# Patient Record
Sex: MALE | Race: BLACK OR AFRICAN AMERICAN | NOT HISPANIC OR LATINO | URBAN - METROPOLITAN AREA
[De-identification: names, ages, dates, MRNs, and addresses within clinical notes are randomized per-mention and may not be internally consistent; named-entity substitution may affect disease eponyms.]

---

## 2020-10-23 ENCOUNTER — LAB OUTSIDE AN ENCOUNTER (OUTPATIENT)
Dept: URBAN - METROPOLITAN AREA CLINIC 118 | Facility: CLINIC | Age: 50
End: 2020-10-23

## 2020-10-23 ENCOUNTER — OFFICE VISIT (OUTPATIENT)
Dept: URBAN - METROPOLITAN AREA CLINIC 118 | Facility: CLINIC | Age: 50
End: 2020-10-23
Payer: COMMERCIAL

## 2020-10-23 ENCOUNTER — TELEPHONE ENCOUNTER (OUTPATIENT)
Dept: URBAN - METROPOLITAN AREA CLINIC 92 | Facility: CLINIC | Age: 50
End: 2020-10-23

## 2020-10-23 DIAGNOSIS — R10.9 ABDOMINAL PAIN: ICD-10-CM

## 2020-10-23 DIAGNOSIS — K51.90 ULCERATIVE COLITIS: ICD-10-CM

## 2020-10-23 PROCEDURE — 99214 OFFICE O/P EST MOD 30 MIN: CPT | Performed by: INTERNAL MEDICINE

## 2020-10-23 RX ORDER — AMLODIPINE AND VALSARTAN 5; 160 MG/1; MG/1
TABLET, FILM COATED ORAL
Qty: 0 | Refills: 0 | Status: ACTIVE | COMMUNITY
Start: 1900-01-01

## 2020-10-23 NOTE — HPI-TODAY'S VISIT:
pt h/o UC and maintained on Sulfasaline reprots recent flair and was admitted outside hospital for 3 days and told flair. Reports symptoms improving with reforming bm's without blood. Denies weight loss or anemia. Denies UGI symptoms. Pt reports was also told may have 'adrenal problem'

## 2020-12-04 ENCOUNTER — OFFICE VISIT (OUTPATIENT)
Dept: URBAN - METROPOLITAN AREA SURGERY CENTER 23 | Facility: SURGERY CENTER | Age: 50
End: 2020-12-04
Payer: COMMERCIAL

## 2020-12-04 DIAGNOSIS — K62.1 DYSPLASTIC POLYP OF RECTUM: ICD-10-CM

## 2020-12-04 DIAGNOSIS — K51.011 CHRONIC ULCERATIVE ENTEROCOLITIS WITH RECTAL BLEEDING: ICD-10-CM

## 2020-12-04 PROCEDURE — G8907 PT DOC NO EVENTS ON DISCHARG: HCPCS | Performed by: INTERNAL MEDICINE

## 2020-12-04 PROCEDURE — 45380 COLONOSCOPY AND BIOPSY: CPT | Performed by: INTERNAL MEDICINE

## 2020-12-04 PROCEDURE — G9937 DIG OR SURV COLSCO: HCPCS | Performed by: INTERNAL MEDICINE

## 2020-12-04 PROCEDURE — 45385 COLONOSCOPY W/LESION REMOVAL: CPT | Performed by: INTERNAL MEDICINE

## 2020-12-09 ENCOUNTER — TELEPHONE ENCOUNTER (OUTPATIENT)
Dept: URBAN - METROPOLITAN AREA CLINIC 118 | Facility: CLINIC | Age: 50
End: 2020-12-09

## 2020-12-09 RX ORDER — SULFASALAZINE 500 MG/1
2 TABLET TABLET ORAL TWICE A DAY
Qty: 120 TABLET | Refills: 5 | OUTPATIENT
Start: 2020-12-10 | End: 2021-06-08

## 2021-12-28 ENCOUNTER — TELEPHONE ENCOUNTER (OUTPATIENT)
Dept: URBAN - METROPOLITAN AREA CLINIC 118 | Facility: CLINIC | Age: 51
End: 2021-12-28

## 2021-12-28 RX ORDER — SULFASALAZINE 500 MG/1
4 TABLET TABLET, DELAYED RELEASE ORAL ONCE A DAY
Qty: 120 TABLET | Refills: 2 | OUTPATIENT
Start: 2021-12-29 | End: 2022-03-29

## 2021-12-29 ENCOUNTER — ERX REFILL RESPONSE (OUTPATIENT)
Dept: URBAN - METROPOLITAN AREA CLINIC 118 | Facility: CLINIC | Age: 51
End: 2021-12-29

## 2021-12-29 RX ORDER — SULFASALAZINE 500 MG/1
4 TABLET TABLET, DELAYED RELEASE ORAL ONCE A DAY
Qty: 120 TABLET | Refills: 2 | OUTPATIENT

## 2021-12-29 RX ORDER — SULFASALAZINE 500 MG/1
TAKE 2 TABLETS BY MOUTH TWICE DAILY TABLET ORAL
Qty: 120 TABLET | Refills: 1 | OUTPATIENT

## 2022-03-01 ENCOUNTER — TELEPHONE ENCOUNTER (OUTPATIENT)
Dept: URBAN - METROPOLITAN AREA CLINIC 92 | Facility: CLINIC | Age: 52
End: 2022-03-01

## 2022-03-01 ENCOUNTER — OFFICE VISIT (OUTPATIENT)
Dept: URBAN - METROPOLITAN AREA CLINIC 118 | Facility: CLINIC | Age: 52
End: 2022-03-01
Payer: COMMERCIAL

## 2022-03-01 DIAGNOSIS — K51.90 ULCERATIVE COLITIS: ICD-10-CM

## 2022-03-01 DIAGNOSIS — R10.9 ABDOMINAL PAIN: ICD-10-CM

## 2022-03-01 PROCEDURE — 99213 OFFICE O/P EST LOW 20 MIN: CPT | Performed by: INTERNAL MEDICINE

## 2022-03-01 RX ORDER — AMLODIPINE AND VALSARTAN 5; 160 MG/1; MG/1
TABLET, FILM COATED ORAL
Qty: 0 | Refills: 0 | Status: ACTIVE | COMMUNITY
Start: 1900-01-01

## 2022-03-01 RX ORDER — SULFASALAZINE 500 MG/1
3 TABLET TABLET ORAL TWICE A DAY
Qty: 540 | Refills: 3 | OUTPATIENT
Start: 2022-03-01 | End: 2022-06-29

## 2022-03-01 RX ORDER — SULFASALAZINE 500 MG/1
TAKE 2 TABLETS BY MOUTH TWICE DAILY TABLET ORAL
Qty: 120 TABLET | Refills: 1 | Status: DISCONTINUED | COMMUNITY

## 2022-03-01 NOTE — HPI-TODAY'S VISIT:
pt presents for follow up UC. pt reports doing well. Pt reports stable GI symptoms with noted no significant diarrhea, constipation or rectal bleeding. Pt notes currently taking Sulfasaline 2 tab po qd. No nausea, vomiting, gerd or other UGI symptoms. No recent weight loss or anemia. pt would like to continue current meds.

## 2023-03-09 ENCOUNTER — LAB OUTSIDE AN ENCOUNTER (OUTPATIENT)
Dept: URBAN - METROPOLITAN AREA CLINIC 118 | Facility: CLINIC | Age: 53
End: 2023-03-09

## 2023-03-09 ENCOUNTER — DASHBOARD ENCOUNTERS (OUTPATIENT)
Age: 53
End: 2023-03-09

## 2023-03-09 ENCOUNTER — OFFICE VISIT (OUTPATIENT)
Dept: URBAN - METROPOLITAN AREA CLINIC 118 | Facility: CLINIC | Age: 53
End: 2023-03-09
Payer: COMMERCIAL

## 2023-03-09 VITALS
SYSTOLIC BLOOD PRESSURE: 147 MMHG | HEIGHT: 71 IN | WEIGHT: 211.8 LBS | HEART RATE: 60 BPM | DIASTOLIC BLOOD PRESSURE: 91 MMHG | TEMPERATURE: 97.7 F | BODY MASS INDEX: 29.65 KG/M2

## 2023-03-09 DIAGNOSIS — K51.80 OTHER ULCERATIVE COLITIS: ICD-10-CM

## 2023-03-09 PROBLEM — 64766004 ULCERATIVE COLITIS: Status: ACTIVE | Noted: 2020-10-23

## 2023-03-09 PROCEDURE — 99213 OFFICE O/P EST LOW 20 MIN: CPT | Performed by: INTERNAL MEDICINE

## 2023-03-09 RX ORDER — AMLODIPINE AND VALSARTAN 5; 160 MG/1; MG/1
TABLET, FILM COATED ORAL
Qty: 0 | Refills: 0 | Status: ACTIVE | COMMUNITY
Start: 1900-01-01

## 2023-03-09 RX ORDER — SULFASALAZINE 500 MG/1
3 TABLET TABLET ORAL TWICE A DAY
Qty: 540 TABLET | Refills: 3 | OUTPATIENT
Start: 2023-03-09 | End: 2024-03-03

## 2023-03-09 NOTE — HPI-TODAY'S VISIT:
Mr. Bansal is a 52 y/o AAM who presents today for f/u on UC.  Was last seen a year ago. Has been doing well on Sulfasalazine. Denies GI bleeding, changes in bowel habits, NV, unintentional weight loss or abdominal pain. No upper GI symptoms. Currently having 3 BM/day, mostly always formed.  Last colon was 12/2020 with Dr. Leigh which showed an 8-9mm inflammatory polyp in the colon. Also showed areas of erythematous, inflammed and vascular pattern decreased mucosa, bx indicating mild to moderate active colitis from the rectum to the right colon, TI was normal. Recommended to repeat in 2 years for surveillance.

## 2023-03-27 ENCOUNTER — OFFICE VISIT (OUTPATIENT)
Dept: URBAN - METROPOLITAN AREA SURGERY CENTER 23 | Facility: SURGERY CENTER | Age: 53
End: 2023-03-27

## 2023-03-27 ENCOUNTER — CLAIMS CREATED FROM THE CLAIM WINDOW (OUTPATIENT)
Dept: URBAN - METROPOLITAN AREA CLINIC 4 | Facility: CLINIC | Age: 53
End: 2023-03-27
Payer: COMMERCIAL

## 2023-03-27 ENCOUNTER — CLAIMS CREATED FROM THE CLAIM WINDOW (OUTPATIENT)
Dept: URBAN - METROPOLITAN AREA SURGERY CENTER 23 | Facility: SURGERY CENTER | Age: 53
End: 2023-03-27
Payer: COMMERCIAL

## 2023-03-27 ENCOUNTER — TELEPHONE ENCOUNTER (OUTPATIENT)
Dept: URBAN - METROPOLITAN AREA CLINIC 35 | Facility: CLINIC | Age: 53
End: 2023-03-27

## 2023-03-27 DIAGNOSIS — K63.89 OTHER SPECIFIED DISEASES OF INTESTINE: ICD-10-CM

## 2023-03-27 DIAGNOSIS — K51.811 OTHER ULCERATIVE COLITIS WITH RECTAL BLEEDING: ICD-10-CM

## 2023-03-27 PROCEDURE — 45380 COLONOSCOPY AND BIOPSY: CPT | Performed by: INTERNAL MEDICINE

## 2023-03-27 PROCEDURE — G8907 PT DOC NO EVENTS ON DISCHARG: HCPCS | Performed by: INTERNAL MEDICINE

## 2023-03-27 PROCEDURE — 88305 TISSUE EXAM BY PATHOLOGIST: CPT | Performed by: PATHOLOGY

## 2023-03-27 RX ORDER — SULFASALAZINE 500 MG/1
3 TABLET TABLET ORAL TWICE A DAY
Qty: 540 TABLET | Refills: 3
Start: 2023-03-09 | End: 2024-03-21

## 2023-03-27 RX ORDER — AMLODIPINE AND VALSARTAN 5; 160 MG/1; MG/1
TABLET, FILM COATED ORAL
Qty: 0 | Refills: 0 | Status: ACTIVE | COMMUNITY
Start: 1900-01-01

## 2023-03-27 RX ORDER — SULFASALAZINE 500 MG/1
3 TABLET TABLET ORAL TWICE A DAY
Qty: 540 TABLET | Refills: 3 | Status: ACTIVE | COMMUNITY
Start: 2023-03-09 | End: 2024-03-03

## 2024-05-29 ENCOUNTER — ERX REFILL RESPONSE (OUTPATIENT)
Dept: URBAN - METROPOLITAN AREA CLINIC 118 | Facility: CLINIC | Age: 54
End: 2024-05-29

## 2024-05-29 RX ORDER — SULFASALAZINE 500 MG/1
TAKE 3 TABLETS BY MOUTH TWICE DAILY TABLET ORAL
Qty: 540 TABLET | Refills: 0 | OUTPATIENT